# Patient Record
Sex: FEMALE | URBAN - METROPOLITAN AREA
[De-identification: names, ages, dates, MRNs, and addresses within clinical notes are randomized per-mention and may not be internally consistent; named-entity substitution may affect disease eponyms.]

---

## 2019-12-05 ENCOUNTER — HOSPITAL ENCOUNTER (OUTPATIENT)
Age: 59
Discharge: HOME | End: 2019-12-07

## 2019-12-05 PROCEDURE — 88305 TISSUE EXAM BY PATHOLOGIST: CPT

## 2022-02-14 ENCOUNTER — TELEPHONE (OUTPATIENT)
Dept: ORTHOPEDIC SURGERY | Age: 62
End: 2022-02-14

## 2022-02-14 NOTE — TELEPHONE ENCOUNTER
Pt called to a f/u for a fx of her rt radius. She went to [facility] with Dr Hortencia Mast on 02-12-22,  Pt stated she is related to Dr Masha Morrell. She was given a referral by Dr Hortencia Mast, she will fax to the office.   Please review and call pt with recommendations/appt info [phone]

## 2022-02-14 NOTE — TELEPHONE ENCOUNTER
Call placed to patient.     Future Appointments   Date Time Provider Fareed Myers   2/18/2022  8:30 AM SCHEDULE, SE ORTHO RES  [facility]

## 2022-02-16 DIAGNOSIS — S52.501A CLOSED FRACTURE OF DISTAL END OF RIGHT RADIUS, UNSPECIFIED FRACTURE MORPHOLOGY, INITIAL ENCOUNTER: Primary | ICD-10-CM

## 2022-02-18 ENCOUNTER — OFFICE VISIT (OUTPATIENT)
Dept: ORTHOPEDIC SURGERY | Age: 62
End: 2022-02-18
Payer: COMMERCIAL

## 2022-02-18 ENCOUNTER — HOSPITAL ENCOUNTER (OUTPATIENT)
Dept: GENERAL RADIOLOGY | Age: 62
Discharge: HOME | End: 2022-02-20
Payer: COMMERCIAL

## 2022-02-18 VITALS — TEMPERATURE: 98 F | HEIGHT: 71 IN | WEIGHT: 176 LBS | BODY MASS INDEX: 24.64 KG/M2

## 2022-02-18 DIAGNOSIS — M25.562 LEFT KNEE PAIN, UNSPECIFIED CHRONICITY: ICD-10-CM

## 2022-02-18 DIAGNOSIS — S52.501A CLOSED FRACTURE OF DISTAL END OF RIGHT RADIUS, UNSPECIFIED FRACTURE MORPHOLOGY, INITIAL ENCOUNTER: ICD-10-CM

## 2022-02-18 DIAGNOSIS — S52.501A CLOSED FRACTURE OF DISTAL END OF RIGHT RADIUS, UNSPECIFIED FRACTURE MORPHOLOGY, INITIAL ENCOUNTER: Primary | ICD-10-CM

## 2022-02-18 PROCEDURE — 73110 X-RAY EXAM OF WRIST: CPT

## 2022-02-18 PROCEDURE — G8427 DOCREV CUR MEDS BY ELIG CLIN: HCPCS | Performed by: ORTHOPAEDIC SURGERY

## 2022-02-18 PROCEDURE — 99202 OFFICE O/P NEW SF 15 MIN: CPT | Performed by: ORTHOPAEDIC SURGERY

## 2022-02-18 PROCEDURE — 3017F COLORECTAL CA SCREEN DOC REV: CPT | Performed by: ORTHOPAEDIC SURGERY

## 2022-02-18 PROCEDURE — 99204 OFFICE O/P NEW MOD 45 MIN: CPT | Performed by: ORTHOPAEDIC SURGERY

## 2022-02-18 PROCEDURE — G8484 FLU IMMUNIZE NO ADMIN: HCPCS | Performed by: ORTHOPAEDIC SURGERY

## 2022-02-18 PROCEDURE — G8420 CALC BMI NORM PARAMETERS: HCPCS | Performed by: ORTHOPAEDIC SURGERY

## 2022-02-18 PROCEDURE — L3809 WHFO W/O JOINTS PRE OTS: HCPCS | Performed by: ORTHOPAEDIC SURGERY

## 2022-02-18 PROCEDURE — 1036F TOBACCO NON-USER: CPT | Performed by: ORTHOPAEDIC SURGERY

## 2022-02-18 RX ORDER — IBUPROFEN 200 MG
200 TABLET ORAL EVERY 6 HOURS PRN
COMMUNITY

## 2022-02-18 NOTE — PROGRESS NOTES
New Patient Orthopaedic Progress Note    Maggie Blair is a 59 y.o. female, her YOB: 1960 with the following history as recorded in [facility]:      Patient Active Problem List    Diagnosis Date Noted    Closed fracture of right distal radius 2022     Current Outpatient Medications   Medication Sig Dispense Refill    ibuprofen (ADVIL) 200 MG tablet Take 200 mg by mouth every 6 hours as needed for Pain       No current facility-administered medications for this visit. Allergies: Patient has no known allergies. No past medical history on file. No past surgical history on file. No family history on file. Social History     Tobacco Use    Smoking status: Former Smoker     Start date:      Quit date:      Years since quittin.1    Smokeless tobacco: Never Used   Substance Use Topics    Alcohol use: Never                             Chief Complaint   Patient presents with    ED Follow-up     right distal radius fx 22. sugar tong splint intact. taking advil for pain. SUBJECTIVE: Wrist Pain  Patient complains of right wrist pain. She reported that she fell 6 days ago prior to clinical presentation in which she fell back onto her hand. She indicated signification pain and bruising. Pain as gradually subsided over the course of the week. XRs taken at an outside facility indicates minimally displaced distal radius. The pain is mild, worsens with movement, and some relief by rest and over the counter medication. There is not associated numbness, weakness in the right hand, fingers and thumb. Pain has been present for 6 days. There is not a history of injury, strain, overuse. Review of Systems   Constitutional: Negative for fever, chills, diaphoresis, appetite change and fatigue. HENT: Negative for dental issues, hearing loss and tinnitus. Negative for congestion, sinus pressure, sneezing, sore throat. Negative for headache.   Eyes: Negative for visual disturbance, blurred and double vision. Negative for pain, discharge, redness and itching  Respiratory: Negative for cough, shortness of breath and wheezing. Cardiovascular: Negative for chest pain, palpitations and leg swelling. No dyspnea on exertion   Gastrointestinal:   Negative for nausea, vomiting, abdominal pain, diarrhea, constipation  or black or bloody. Hematologic\Lymphatic:  negative for bleeding, petechiae,   Genitourinary: Negative for hematuria and difficulty urinating. Musculoskeletal: Negative for neck pain and stiffness. Negative for back pain, see HPI  Skin: Negative for pallor, rash and wound. Neurological: Negative for dizziness, tremors, seizures, weakness, light-headedness, no TIA or stroke symptoms. No numbness and headaches. Psychiatric/Behavioral: Negative. OBJECTIVE:      Physical Examination:   General appearance: alert, well appearing, and in no distress,  normal appearing weight. No visible signs of trauma   Mental status: alert, oriented to person, place, and time, normal mood, behavior, speech, dress, motor activity, and thought processes  Abdomen: soft, nondistended  Resp:   resp easy and unlabored, no audible wheezes note, normal symmetrical expansion of both hemithoraces  Cardiac: distal pulses palpable, skin and extremities well perfused  Neurological: alert, oriented X3, normal speech, no focal findings or movement disorder noted, motor and sensory grossly normal bilaterally, normal muscle tone, no tremors, strength 5/5, normal gait and station  HEENT: normochephalic atraumatic, external ears and eyes normal, sclera normal, neck supple, no nasal discharge. Extremities:   peripheral pulses normal, no edema, redness or tenderness in the calves   Skin: normal coloration, no rashes or open wounds, no suspicious skin lesions noted  Psych: Affect euthymic   Musculoskeletal:   Extremity:  Skin intact circumferentially. Mild bruising located on the ulnar styloid.   Ulnar styloid prominence present compared to contralateral side  Mild TTP to dorsal aspect of distal radius. NO TTP to radial shaft or elbow  Full ROM at the wrist, elbow and shoulder  Sensation grossly intact to radial, ulnar, and median nerves  Motor distribution intact median, ulnar, radial, AIN, PIN  Cap refill <2   Compartment soft and compressible. Temp 98 °F (36.7 °C) (Oral)   Ht 5' 11\" (1.803 m)   Wt 176 lb (79.8 kg)   BMI 24.55 kg/m²      XR: 2/18/22 Right wrist demonstrating a minimal displaced distal radius fracture. Very minimal cortical disruption intraarticular. No other fractures or dislocation notes on plain imaging. ASSESSMENT:     Diagnosis Orders   1. Closed fracture of distal end of right radius, unspecified fracture morphology, initial encounter         Discussion: Had lengthy discussion with patient regarding Her diagnosis, typical prognosis, and expected outcomes. I reviewed the possible complications from the injury itself despite treatment choosen. I also discussed treatment options including nonoperative managements versus surgical management, along with risks and benefits of each. They have elected for nonoperative management at this time. PLAN:  Weight bearing as tolerated to the right wrist.  Splint removed in clinic and removable Velcro brace provided to the patient  Educated to avoid heavy lifting, pushing and pulling. Brace can be removed for gentle every day activities and cleansing. Repeat XR of affect wrist and Left knee in 4 weeks. Discussed with attending    Electronically signed by Sarkis Monae DO on 2/18/2022     Orthopaedic Attending    I have seen and evaluated the patient with the resident and agree with the above assessments on today's visit. I have performed the key components of the history and physical examination and concur completely with the findings and plans as documented above. Patient seen today for right wrist pain.   Imaging demonstrates occult fracture through the radial styloid which is consistent with her clinical examination. Treatment options discussed and outlined including operative and nonoperative managements for distal radius fractures. Patient is in agreement for close treatment for her fracture. Patient immobilized with a Velcro wrist brace. Discussed rec nonweightbearing, she is to continue with the brace full-time. To see his back in the office in 4 weeks for repeat evaluation. Patient states she also like to be evaluated for her left knee at next visit she has had some intermittent swelling which has been chronic in nature. She is able to fully weight-bear on the left knee at this time. Electronically signed by   David Blankenship DO  2/18/22      Note: This report was completed using [employer] voiced recognition software. Every effort has been made to ensure accuracy; however, inadvertent computerized transcription errors may be present.

## 2022-02-18 NOTE — PATIENT INSTRUCTIONS
Weightbearing: WBAT    Activity restrictions: Pt is to increase activities as tolerated. Swelling Control: Elevate arm/hand above the level of the heart Ice application    Pain Control:NSAID: Ibuprofen    Therapy: Gentle range of motion when brace is removed. Continue: Continue current pain medication regimen. Gentle range of motion when out of the brace. Brace can be removed for gentle every day activities and cleansing purposes    Follow up: In 4 weeks    Please call the office at [phone] or send [employer] message to providers sooner with any questions or concerns  Strongly recommend all of our patients sign up for [employer] in order to have direct communication VIA [employer] JACKY with our clinic staff.

## 2022-03-17 ENCOUNTER — HOSPITAL ENCOUNTER (OUTPATIENT)
Dept: GENERAL RADIOLOGY | Age: 62
Discharge: HOME | End: 2022-03-19
Payer: COMMERCIAL

## 2022-03-17 ENCOUNTER — OFFICE VISIT (OUTPATIENT)
Dept: ORTHOPEDIC SURGERY | Age: 62
End: 2022-03-17
Payer: COMMERCIAL

## 2022-03-17 DIAGNOSIS — M25.462 EFFUSION OF LEFT KNEE: ICD-10-CM

## 2022-03-17 DIAGNOSIS — S52.501A CLOSED FRACTURE OF DISTAL END OF RIGHT RADIUS, UNSPECIFIED FRACTURE MORPHOLOGY, INITIAL ENCOUNTER: ICD-10-CM

## 2022-03-17 DIAGNOSIS — S52.501A CLOSED FRACTURE OF DISTAL END OF RIGHT RADIUS, UNSPECIFIED FRACTURE MORPHOLOGY, INITIAL ENCOUNTER: Primary | ICD-10-CM

## 2022-03-17 DIAGNOSIS — M25.562 LEFT KNEE PAIN, UNSPECIFIED CHRONICITY: ICD-10-CM

## 2022-03-17 DIAGNOSIS — M17.12 OSTEOARTHRITIS OF LEFT KNEE, UNSPECIFIED OSTEOARTHRITIS TYPE: ICD-10-CM

## 2022-03-17 PROCEDURE — 1036F TOBACCO NON-USER: CPT | Performed by: PHYSICIAN ASSISTANT

## 2022-03-17 PROCEDURE — 99212 OFFICE O/P EST SF 10 MIN: CPT | Performed by: PHYSICIAN ASSISTANT

## 2022-03-17 PROCEDURE — G8420 CALC BMI NORM PARAMETERS: HCPCS | Performed by: PHYSICIAN ASSISTANT

## 2022-03-17 PROCEDURE — G8484 FLU IMMUNIZE NO ADMIN: HCPCS | Performed by: PHYSICIAN ASSISTANT

## 2022-03-17 PROCEDURE — 73110 X-RAY EXAM OF WRIST: CPT

## 2022-03-17 PROCEDURE — 73562 X-RAY EXAM OF KNEE 3: CPT

## 2022-03-17 PROCEDURE — 99213 OFFICE O/P EST LOW 20 MIN: CPT | Performed by: PHYSICIAN ASSISTANT

## 2022-03-17 PROCEDURE — G8427 DOCREV CUR MEDS BY ELIG CLIN: HCPCS | Performed by: PHYSICIAN ASSISTANT

## 2022-03-17 PROCEDURE — 3017F COLORECTAL CA SCREEN DOC REV: CPT | Performed by: PHYSICIAN ASSISTANT

## 2022-03-17 RX ORDER — M-VIT,TX,IRON,MINS/CALC/FOLIC 27MG-0.4MG
1 TABLET ORAL DAILY
COMMUNITY

## 2022-03-17 NOTE — PROGRESS NOTES
Chief Complaint   Patient presents with    Wrist Injury     02/12/22 right radius fx. wearing brace. pain controlled, NWB.    Knee Pain     left knee pain and swelling. started late december, PCP ordered steroid which alleviated swelling, but swelling returned when steroid completed. Subjective:  Maggie Blair is approximately 4.5 wks follow-up from the above DOI from approximately 2/13/22. Presents today in wrist brace. Denies any wrist pain or paresthesias. States that her wrist fracture is doing very well. States that for the past couple of years she has had intermittent left knee effusions attributable to left knee OA without injury or erythema or warmth or fever or chills. States that sometimes her knee became so edematous that is hard to bend to put clothing on. She is still full weightbearing left lower extremity does not use any assistive devices. States that left knee is really painful and does not have to take anything for pain, and has tried to elevate but has not tried consistent compression or ice. Review of Systems -  all pertinent positives and negatives in HPI. Objective:    General: Alert and oriented X 3, normocephalic atraumatic, external ears and eye normal, sclera clear, no acute distress, respirations easy and unlabored with no audible wheezes, skin warm and dry, speech and dress appropriate for noted age, affect euthymic. Extremity:  Left Lower Extremity  Skin clean dry and intact, without signs of infection  AROM L knee 0-120  Small L knee effusion with all landmarks still easily palpable  L knee nontender globally   Compartments supple throughout thigh and leg  Calf supple and nontender  Demonstrates active ankle plantar/dorsiflexion/great toe extension. States sensation intact to touch in sural/deep peroneal/superficial peroneal/saphenous/posterior tibial nerve distributions to foot/ankle.    Palpable dorsalis pedis and posterior tibialis pulses, cap refill brisk in toes, foot warm/perfused. Right Upper Extremity  Skin is clean dry and intact  No wrist edema noted  Radial pulse palpable, fingers warm with BCR  Flex/extension intact to wrist, thumb and fingers  Full active wrist flexion, extension, ulnar and radial deviation without any discomfort  Nontender globally around the wrist  Actively make full composite fist  Finger opposition intact  Finger adduction/abduction intact  Finger crossover intact  Subjectively states sensation intact to radial/medial/ulnar distribution            There were no vitals taken for this visit. XR:   Narrative   EXAMINATION:   THREE XRAY VIEWS OF THE LEFT KNEE       3/17/2022 8:21 am       COMPARISON:   None.       HISTORY:   ORDERING SYSTEM PROVIDED HISTORY: Left knee pain, unspecified chronicity   TECHNOLOGIST PROVIDED HISTORY:   What reading provider will be dictating this exam?->[facility]       FINDINGS:   Three views of the left knee were obtained. Richard Letters is no acute fracture or   dislocation of the left knee.  There are moderate degenerative changes of the   left knee.  There is no osseous lesion.  There is a small joint effusion.           Impression   1. Moderate degenerative changes of the left knee. 2. Small joint effusion.           Narrative   EXAMINATION:   XRAY VIEWS OF THE RIGHT WRIST       3/17/2022 8:21 am       COMPARISON:   None.       HISTORY:   ORDERING SYSTEM PROVIDED HISTORY:  Closed fracture of distal end of right   radius, unspecified fracture morphology, initial encounter. TECHNOLOGIST PROVIDED HISTORY:   What reading provider will be dictating this exam? [facility]       FINDINGS:   Three views of the right wrist were obtained. Richard Letters is a healing horizontal   fracture through the distal radius.  A vague fracture lucency persists. Anatomical alignment is maintained.       The carpal bones are normally aligned.                 Assessment:   Diagnosis Orders   1.  Closed fracture of distal end of right radius, unspecified fracture morphology, initial encounter     2. Effusion of left knee     3. Osteoarthritis of left knee, unspecified osteoarthritis type         Plan:   Reviewed x-rays with patient today in office    Can gradually increase weightbearing activity the right upper extremity with wrist brace on if tolerable. Once full weightbearing with the brace on without pain, can wean from brace altogether. Can remove brace when sedentary.  Discussed typical treatment algorithm of knee osteoarthritis. Patient does have a small effusion today, but not nearly enough to warrant aspiration. No signs of infection. Recommend ice, elevation, compression, over-the-counter knee brace if needed for compression. Call the office if conditions change or worsen to consider knee aspiration. Follow up as needed    Electronically signed by Kirill Soares PA-C on 3/17/2022 at 9:26 AM  Note: This report was completed using [employer] voiced recognition software. Every effort has been made to ensure accuracy; however, inadvertent computerized transcription errors may be present.

## 2024-05-04 ENCOUNTER — APPOINTMENT (OUTPATIENT)
Dept: GENERAL RADIOLOGY | Age: 64
DRG: 482 | End: 2024-05-04
Payer: MEDICARE

## 2024-05-04 ENCOUNTER — HOSPITAL ENCOUNTER (INPATIENT)
Age: 64
LOS: 1 days | Discharge: HOME HEALTH | DRG: 482 | End: 2024-05-06
Attending: EMERGENCY MEDICINE | Admitting: HOSPITALIST
Payer: MEDICARE

## 2024-05-04 DIAGNOSIS — S72.144A CLOSED NONDISPLACED INTERTROCHANTERIC FRACTURE OF RIGHT FEMUR, INITIAL ENCOUNTER (HCC): ICD-10-CM
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
## 2024-05-04 DIAGNOSIS — S72.141A INTERTROCHANTERIC FRACTURE OF RIGHT FEMUR, CLOSED, INITIAL ENCOUNTER (HCC): ICD-10-CM

## 2024-05-04 DIAGNOSIS — S62.001A CLOSED NONDISPLACED FRACTURE OF SCAPHOID OF RIGHT WRIST, UNSPECIFIED PORTION OF SCAPHOID, INITIAL ENCOUNTER: ICD-10-CM

## 2024-05-04 PROCEDURE — 73502 X-RAY EXAM HIP UNI 2-3 VIEWS: CPT

## 2024-05-04 PROCEDURE — 96372 THER/PROPH/DIAG INJ SC/IM: CPT

## 2024-05-04 PROCEDURE — 99285 EMERGENCY DEPT VISIT HI MDM: CPT

## 2024-05-04 PROCEDURE — 73552 X-RAY EXAM OF FEMUR 2/>: CPT

## 2024-05-04 PROCEDURE — 6370000000 HC RX 637 (ALT 250 FOR IP): Performed by: EMERGENCY MEDICINE

## 2024-05-04 PROCEDURE — 6360000002 HC RX W HCPCS: Performed by: EMERGENCY MEDICINE

## 2024-05-04 PROCEDURE — 29125 APPL SHORT ARM SPLINT STATIC: CPT

## 2024-05-04 PROCEDURE — 73610 X-RAY EXAM OF ANKLE: CPT

## 2024-05-04 PROCEDURE — 71045 X-RAY EXAM CHEST 1 VIEW: CPT

## 2024-05-04 PROCEDURE — 73110 X-RAY EXAM OF WRIST: CPT

## 2024-05-04 RX ORDER — HYDROCODONE BITARTRATE AND ACETAMINOPHEN 5; 325 MG/1; MG/1
2 TABLET ORAL ONCE
Status: COMPLETED | OUTPATIENT
Start: 2024-05-04 | End: 2024-05-04

## 2024-05-04 RX ORDER — ORPHENADRINE CITRATE 30 MG/ML
60 INJECTION INTRAMUSCULAR; INTRAVENOUS ONCE
Status: COMPLETED | OUTPATIENT
Start: 2024-05-04 | End: 2024-05-04

## 2024-05-04 RX ADMIN — ORPHENADRINE CITRATE 60 MG: 30 INJECTION, SOLUTION INTRAMUSCULAR; INTRAVENOUS at 23:32

## 2024-05-04 RX ADMIN — HYDROCODONE BITARTRATE AND ACETAMINOPHEN 2 TABLET: 5; 325 TABLET ORAL at 23:31

## 2024-05-04 ASSESSMENT — PAIN DESCRIPTION - LOCATION
LOCATION: HIP

## 2024-05-04 ASSESSMENT — PAIN SCALES - GENERAL
PAINLEVEL_OUTOF10: 8
PAINLEVEL_OUTOF10: 6
PAINLEVEL_OUTOF10: 6

## 2024-05-04 ASSESSMENT — LIFESTYLE VARIABLES
HOW OFTEN DO YOU HAVE A DRINK CONTAINING ALCOHOL: MONTHLY OR LESS
HOW MANY STANDARD DRINKS CONTAINING ALCOHOL DO YOU HAVE ON A TYPICAL DAY: 1 OR 2

## 2024-05-04 ASSESSMENT — PAIN DESCRIPTION - ORIENTATION
ORIENTATION: RIGHT

## 2024-05-04 ASSESSMENT — PAIN DESCRIPTION - PAIN TYPE: TYPE: ACUTE PAIN

## 2024-05-04 ASSESSMENT — PAIN DESCRIPTION - FREQUENCY: FREQUENCY: INTERMITTENT

## 2024-05-04 ASSESSMENT — PAIN DESCRIPTION - DESCRIPTORS: DESCRIPTORS: DISCOMFORT

## 2024-05-05 ENCOUNTER — ANESTHESIA (OUTPATIENT)
Dept: OPERATING ROOM | Age: 64
End: 2024-05-05
Payer: MEDICARE

## 2024-05-05 ENCOUNTER — APPOINTMENT (OUTPATIENT)
Dept: GENERAL RADIOLOGY | Age: 64
DRG: 482 | End: 2024-05-05
Payer: MEDICARE

## 2024-05-05 ENCOUNTER — ANESTHESIA EVENT (OUTPATIENT)
Dept: OPERATING ROOM | Age: 64
End: 2024-05-05
Payer: MEDICARE

## 2024-05-05 PROBLEM — S72.141A INTERTROCHANTERIC FRACTURE OF RIGHT FEMUR, CLOSED, INITIAL ENCOUNTER (HCC): Status: ACTIVE | Noted: 2024-05-05

## 2024-05-05 LAB
BASOPHILS # BLD: 0.09 K/UL (ref 0–0.2)
BASOPHILS NFR BLD: 1 % (ref 0–2)
EOSINOPHIL # BLD: 0.14 K/UL (ref 0.05–0.5)
EOSINOPHILS RELATIVE PERCENT: 1 % (ref 0–6)
ERYTHROCYTE [DISTWIDTH] IN BLOOD BY AUTOMATED COUNT: 13.5 % (ref 11.5–15)
HCT VFR BLD AUTO: 40.2 % (ref 34–48)
HGB BLD-MCNC: 13.2 G/DL (ref 11.5–15.5)
IMM GRANULOCYTES # BLD AUTO: 0.08 K/UL (ref 0–0.58)
IMM GRANULOCYTES NFR BLD: 1 % (ref 0–5)
INR PPP: 1
LYMPHOCYTES NFR BLD: 1.96 K/UL (ref 1.5–4)
LYMPHOCYTES RELATIVE PERCENT: 13 % (ref 20–42)
MCH RBC QN AUTO: 31.2 PG (ref 26–35)
MCHC RBC AUTO-ENTMCNC: 32.8 G/DL (ref 32–34.5)
MCV RBC AUTO: 95 FL (ref 80–99.9)
MONOCYTES NFR BLD: 0.86 K/UL (ref 0.1–0.95)
MONOCYTES NFR BLD: 6 % (ref 2–12)
NEUTROPHILS NFR BLD: 79 % (ref 43–80)
NEUTS SEG NFR BLD: 11.84 K/UL (ref 1.8–7.3)
PLATELET # BLD AUTO: 320 K/UL (ref 130–450)
PMV BLD AUTO: 10.8 FL (ref 7–12)
PROTHROMBIN TIME: 10.9 SEC (ref 9.3–12.4)
RBC # BLD AUTO: 4.23 M/UL (ref 3.5–5.5)
WBC OTHER # BLD: 15 K/UL (ref 4.5–11.5)

## 2024-05-05 PROCEDURE — 6370000000 HC RX 637 (ALT 250 FOR IP): Performed by: NURSE PRACTITIONER

## 2024-05-05 PROCEDURE — 6370000000 HC RX 637 (ALT 250 FOR IP): Performed by: STUDENT IN AN ORGANIZED HEALTH CARE EDUCATION/TRAINING PROGRAM

## 2024-05-05 PROCEDURE — 7100000000 HC PACU RECOVERY - FIRST 15 MIN: Performed by: STUDENT IN AN ORGANIZED HEALTH CARE EDUCATION/TRAINING PROGRAM

## 2024-05-05 PROCEDURE — 2500000003 HC RX 250 WO HCPCS: Performed by: NURSE ANESTHETIST, CERTIFIED REGISTERED

## 2024-05-05 PROCEDURE — 85025 COMPLETE CBC W/AUTO DIFF WBC: CPT

## 2024-05-05 PROCEDURE — 2500000003 HC RX 250 WO HCPCS: Performed by: STUDENT IN AN ORGANIZED HEALTH CARE EDUCATION/TRAINING PROGRAM

## 2024-05-05 PROCEDURE — 3600000014 HC SURGERY LEVEL 4 ADDTL 15MIN: Performed by: STUDENT IN AN ORGANIZED HEALTH CARE EDUCATION/TRAINING PROGRAM

## 2024-05-05 PROCEDURE — 3600000004 HC SURGERY LEVEL 4 BASE: Performed by: STUDENT IN AN ORGANIZED HEALTH CARE EDUCATION/TRAINING PROGRAM

## 2024-05-05 PROCEDURE — 2580000003 HC RX 258: Performed by: STUDENT IN AN ORGANIZED HEALTH CARE EDUCATION/TRAINING PROGRAM

## 2024-05-05 PROCEDURE — 85610 PROTHROMBIN TIME: CPT

## 2024-05-05 PROCEDURE — 6360000002 HC RX W HCPCS: Performed by: NURSE ANESTHETIST, CERTIFIED REGISTERED

## 2024-05-05 PROCEDURE — 1200000000 HC SEMI PRIVATE

## 2024-05-05 PROCEDURE — 6360000002 HC RX W HCPCS: Performed by: STUDENT IN AN ORGANIZED HEALTH CARE EDUCATION/TRAINING PROGRAM

## 2024-05-05 PROCEDURE — C1769 GUIDE WIRE: HCPCS | Performed by: STUDENT IN AN ORGANIZED HEALTH CARE EDUCATION/TRAINING PROGRAM

## 2024-05-05 PROCEDURE — 7100000001 HC PACU RECOVERY - ADDTL 15 MIN: Performed by: STUDENT IN AN ORGANIZED HEALTH CARE EDUCATION/TRAINING PROGRAM

## 2024-05-05 PROCEDURE — 3700000000 HC ANESTHESIA ATTENDED CARE: Performed by: STUDENT IN AN ORGANIZED HEALTH CARE EDUCATION/TRAINING PROGRAM

## 2024-05-05 PROCEDURE — 6360000002 HC RX W HCPCS: Performed by: ANESTHESIOLOGY

## 2024-05-05 PROCEDURE — 82565 ASSAY OF CREATININE: CPT

## 2024-05-05 PROCEDURE — 2720000010 HC SURG SUPPLY STERILE: Performed by: STUDENT IN AN ORGANIZED HEALTH CARE EDUCATION/TRAINING PROGRAM

## 2024-05-05 PROCEDURE — 3700000001 HC ADD 15 MINUTES (ANESTHESIA): Performed by: STUDENT IN AN ORGANIZED HEALTH CARE EDUCATION/TRAINING PROGRAM

## 2024-05-05 PROCEDURE — 0QS606Z REPOSITION RIGHT UPPER FEMUR WITH INTRAMEDULLARY INTERNAL FIXATION DEVICE, OPEN APPROACH: ICD-10-PCS | Performed by: STUDENT IN AN ORGANIZED HEALTH CARE EDUCATION/TRAINING PROGRAM

## 2024-05-05 PROCEDURE — 87081 CULTURE SCREEN ONLY: CPT

## 2024-05-05 PROCEDURE — 2709999900 HC NON-CHARGEABLE SUPPLY: Performed by: STUDENT IN AN ORGANIZED HEALTH CARE EDUCATION/TRAINING PROGRAM

## 2024-05-05 PROCEDURE — 2580000003 HC RX 258: Performed by: NURSE ANESTHETIST, CERTIFIED REGISTERED

## 2024-05-05 PROCEDURE — C1713 ANCHOR/SCREW BN/BN,TIS/BN: HCPCS | Performed by: STUDENT IN AN ORGANIZED HEALTH CARE EDUCATION/TRAINING PROGRAM

## 2024-05-05 DEVICE — SCREW BNE L36MM DIA5MM TIB LT GRN TI ST CANN LOK FULL THRD: Type: IMPLANTABLE DEVICE | Site: HIP | Status: FUNCTIONAL

## 2024-05-05 DEVICE — IMPLANTABLE DEVICE: Type: IMPLANTABLE DEVICE | Site: HIP | Status: FUNCTIONAL

## 2024-05-05 DEVICE — SCREW BNE L105MM DIA10.35MM G TI CANN PERF FOR PROX FEM: Type: IMPLANTABLE DEVICE | Site: HIP | Status: FUNCTIONAL

## 2024-05-05 RX ORDER — ACETAMINOPHEN 325 MG/1
650 TABLET ORAL EVERY 6 HOURS PRN
Status: DISCONTINUED | OUTPATIENT
Start: 2024-05-05 | End: 2024-05-06 | Stop reason: HOSPADM

## 2024-05-05 RX ORDER — ASPIRIN 81 MG/1
81 TABLET ORAL 2 TIMES DAILY
Status: DISCONTINUED | OUTPATIENT
Start: 2024-05-05 | End: 2024-05-06 | Stop reason: HOSPADM

## 2024-05-05 RX ORDER — SODIUM CHLORIDE 0.9 % (FLUSH) 0.9 %
5-40 SYRINGE (ML) INJECTION PRN
Status: DISCONTINUED | OUTPATIENT
Start: 2024-05-05 | End: 2024-05-06 | Stop reason: HOSPADM

## 2024-05-05 RX ORDER — HYDROCODONE BITARTRATE AND ACETAMINOPHEN 5; 325 MG/1; MG/1
2 TABLET ORAL EVERY 6 HOURS PRN
Status: DISCONTINUED | OUTPATIENT
Start: 2024-05-05 | End: 2024-05-06 | Stop reason: HOSPADM

## 2024-05-05 RX ORDER — MIDAZOLAM HYDROCHLORIDE 2 MG/2ML
2 INJECTION, SOLUTION INTRAMUSCULAR; INTRAVENOUS
Status: DISCONTINUED | OUTPATIENT
Start: 2024-05-05 | End: 2024-05-05 | Stop reason: HOSPADM

## 2024-05-05 RX ORDER — SODIUM CHLORIDE 0.9 % (FLUSH) 0.9 %
5-40 SYRINGE (ML) INJECTION PRN
Status: DISCONTINUED | OUTPATIENT
Start: 2024-05-05 | End: 2024-05-05 | Stop reason: HOSPADM

## 2024-05-05 RX ORDER — DEXAMETHASONE SODIUM PHOSPHATE 4 MG/ML
INJECTION, SOLUTION INTRA-ARTICULAR; INTRALESIONAL; INTRAMUSCULAR; INTRAVENOUS; SOFT TISSUE PRN
Status: DISCONTINUED | OUTPATIENT
Start: 2024-05-05 | End: 2024-05-05 | Stop reason: SDUPTHER

## 2024-05-05 RX ORDER — ROCURONIUM BROMIDE 10 MG/ML
INJECTION, SOLUTION INTRAVENOUS PRN
Status: DISCONTINUED | OUTPATIENT
Start: 2024-05-05 | End: 2024-05-05 | Stop reason: SDUPTHER

## 2024-05-05 RX ORDER — ONDANSETRON 2 MG/ML
4 INJECTION INTRAMUSCULAR; INTRAVENOUS EVERY 6 HOURS PRN
Status: DISCONTINUED | OUTPATIENT
Start: 2024-05-05 | End: 2024-05-06 | Stop reason: HOSPADM

## 2024-05-05 RX ORDER — POTASSIUM CHLORIDE 7.45 MG/ML
10 INJECTION INTRAVENOUS PRN
Status: DISCONTINUED | OUTPATIENT
Start: 2024-05-05 | End: 2024-05-06 | Stop reason: HOSPADM

## 2024-05-05 RX ORDER — MEPERIDINE HYDROCHLORIDE 25 MG/ML
12.5 INJECTION INTRAMUSCULAR; INTRAVENOUS; SUBCUTANEOUS EVERY 5 MIN PRN
Status: DISCONTINUED | OUTPATIENT
Start: 2024-05-05 | End: 2024-05-05 | Stop reason: HOSPADM

## 2024-05-05 RX ORDER — IPRATROPIUM BROMIDE AND ALBUTEROL SULFATE 2.5; .5 MG/3ML; MG/3ML
1 SOLUTION RESPIRATORY (INHALATION)
Status: DISCONTINUED | OUTPATIENT
Start: 2024-05-05 | End: 2024-05-05 | Stop reason: HOSPADM

## 2024-05-05 RX ORDER — MORPHINE SULFATE 2 MG/ML
2 INJECTION, SOLUTION INTRAMUSCULAR; INTRAVENOUS EVERY 4 HOURS PRN
Status: DISCONTINUED | OUTPATIENT
Start: 2024-05-05 | End: 2024-05-06 | Stop reason: HOSPADM

## 2024-05-05 RX ORDER — PROPOFOL 10 MG/ML
INJECTION, EMULSION INTRAVENOUS PRN
Status: DISCONTINUED | OUTPATIENT
Start: 2024-05-05 | End: 2024-05-05 | Stop reason: SDUPTHER

## 2024-05-05 RX ORDER — SODIUM CHLORIDE 0.9 % (FLUSH) 0.9 %
5-40 SYRINGE (ML) INJECTION EVERY 12 HOURS SCHEDULED
Status: DISCONTINUED | OUTPATIENT
Start: 2024-05-05 | End: 2024-05-06 | Stop reason: HOSPADM

## 2024-05-05 RX ORDER — POTASSIUM CHLORIDE 20 MEQ/1
40 TABLET, EXTENDED RELEASE ORAL PRN
Status: DISCONTINUED | OUTPATIENT
Start: 2024-05-05 | End: 2024-05-06 | Stop reason: HOSPADM

## 2024-05-05 RX ORDER — SODIUM CHLORIDE 0.9 % (FLUSH) 0.9 %
5-40 SYRINGE (ML) INJECTION EVERY 12 HOURS SCHEDULED
Status: DISCONTINUED | OUTPATIENT
Start: 2024-05-05 | End: 2024-05-05 | Stop reason: HOSPADM

## 2024-05-05 RX ORDER — LIDOCAINE HYDROCHLORIDE 20 MG/ML
INJECTION, SOLUTION EPIDURAL; INFILTRATION; INTRACAUDAL; PERINEURAL PRN
Status: DISCONTINUED | OUTPATIENT
Start: 2024-05-05 | End: 2024-05-05 | Stop reason: SDUPTHER

## 2024-05-05 RX ORDER — PHENYLEPHRINE HCL IN 0.9% NACL 1 MG/10 ML
SYRINGE (ML) INTRAVENOUS PRN
Status: DISCONTINUED | OUTPATIENT
Start: 2024-05-05 | End: 2024-05-05 | Stop reason: SDUPTHER

## 2024-05-05 RX ORDER — FENTANYL CITRATE 50 UG/ML
INJECTION, SOLUTION INTRAMUSCULAR; INTRAVENOUS PRN
Status: DISCONTINUED | OUTPATIENT
Start: 2024-05-05 | End: 2024-05-05 | Stop reason: SDUPTHER

## 2024-05-05 RX ORDER — ACETAMINOPHEN 650 MG/1
650 SUPPOSITORY RECTAL EVERY 6 HOURS PRN
Status: DISCONTINUED | OUTPATIENT
Start: 2024-05-05 | End: 2024-05-06 | Stop reason: HOSPADM

## 2024-05-05 RX ORDER — SODIUM CHLORIDE 9 MG/ML
INJECTION, SOLUTION INTRAVENOUS PRN
Status: DISCONTINUED | OUTPATIENT
Start: 2024-05-05 | End: 2024-05-06 | Stop reason: HOSPADM

## 2024-05-05 RX ORDER — DIPHENHYDRAMINE HYDROCHLORIDE 50 MG/ML
INJECTION INTRAMUSCULAR; INTRAVENOUS PRN
Status: DISCONTINUED | OUTPATIENT
Start: 2024-05-05 | End: 2024-05-05 | Stop reason: SDUPTHER

## 2024-05-05 RX ORDER — SODIUM CHLORIDE, SODIUM LACTATE, POTASSIUM CHLORIDE, CALCIUM CHLORIDE 600; 310; 30; 20 MG/100ML; MG/100ML; MG/100ML; MG/100ML
INJECTION, SOLUTION INTRAVENOUS CONTINUOUS PRN
Status: DISCONTINUED | OUTPATIENT
Start: 2024-05-05 | End: 2024-05-05 | Stop reason: SDUPTHER

## 2024-05-05 RX ORDER — ONDANSETRON 4 MG/1
4 TABLET, ORALLY DISINTEGRATING ORAL EVERY 8 HOURS PRN
Status: DISCONTINUED | OUTPATIENT
Start: 2024-05-05 | End: 2024-05-06 | Stop reason: HOSPADM

## 2024-05-05 RX ORDER — SODIUM CHLORIDE 0.9 % (FLUSH) 0.9 %
10 SYRINGE (ML) INJECTION EVERY 12 HOURS SCHEDULED
Status: DISCONTINUED | OUTPATIENT
Start: 2024-05-05 | End: 2024-05-05 | Stop reason: SDUPTHER

## 2024-05-05 RX ORDER — TRANEXAMIC ACID 10 MG/ML
INJECTION, SOLUTION INTRAVENOUS
Status: COMPLETED
Start: 2024-05-05 | End: 2024-05-05

## 2024-05-05 RX ORDER — HYDRALAZINE HYDROCHLORIDE 20 MG/ML
10 INJECTION INTRAMUSCULAR; INTRAVENOUS
Status: DISCONTINUED | OUTPATIENT
Start: 2024-05-05 | End: 2024-05-05 | Stop reason: HOSPADM

## 2024-05-05 RX ORDER — SENNOSIDES A AND B 8.6 MG/1
1 TABLET, FILM COATED ORAL DAILY PRN
Status: DISCONTINUED | OUTPATIENT
Start: 2024-05-05 | End: 2024-05-06 | Stop reason: HOSPADM

## 2024-05-05 RX ORDER — MAGNESIUM SULFATE IN WATER 40 MG/ML
2000 INJECTION, SOLUTION INTRAVENOUS PRN
Status: DISCONTINUED | OUTPATIENT
Start: 2024-05-05 | End: 2024-05-06 | Stop reason: HOSPADM

## 2024-05-05 RX ORDER — ONDANSETRON 2 MG/ML
4 INJECTION INTRAMUSCULAR; INTRAVENOUS
Status: COMPLETED | OUTPATIENT
Start: 2024-05-05 | End: 2024-05-05

## 2024-05-05 RX ORDER — CEFAZOLIN 2 G/1
INJECTION, POWDER, FOR SOLUTION INTRAMUSCULAR; INTRAVENOUS
Status: COMPLETED
Start: 2024-05-05 | End: 2024-05-05

## 2024-05-05 RX ORDER — KETOROLAC TROMETHAMINE 30 MG/ML
INJECTION, SOLUTION INTRAMUSCULAR; INTRAVENOUS PRN
Status: DISCONTINUED | OUTPATIENT
Start: 2024-05-05 | End: 2024-05-05 | Stop reason: SDUPTHER

## 2024-05-05 RX ORDER — NALOXONE HYDROCHLORIDE 0.4 MG/ML
INJECTION, SOLUTION INTRAMUSCULAR; INTRAVENOUS; SUBCUTANEOUS PRN
Status: DISCONTINUED | OUTPATIENT
Start: 2024-05-05 | End: 2024-05-05 | Stop reason: HOSPADM

## 2024-05-05 RX ORDER — MIDAZOLAM HYDROCHLORIDE 1 MG/ML
INJECTION INTRAMUSCULAR; INTRAVENOUS PRN
Status: DISCONTINUED | OUTPATIENT
Start: 2024-05-05 | End: 2024-05-05 | Stop reason: SDUPTHER

## 2024-05-05 RX ORDER — LABETALOL HYDROCHLORIDE 5 MG/ML
10 INJECTION, SOLUTION INTRAVENOUS
Status: DISCONTINUED | OUTPATIENT
Start: 2024-05-05 | End: 2024-05-05 | Stop reason: HOSPADM

## 2024-05-05 RX ORDER — TRANEXAMIC ACID 10 MG/ML
1000 INJECTION, SOLUTION INTRAVENOUS ONCE
Status: COMPLETED | OUTPATIENT
Start: 2024-05-05 | End: 2024-05-05

## 2024-05-05 RX ORDER — SODIUM CHLORIDE 9 MG/ML
INJECTION, SOLUTION INTRAVENOUS PRN
Status: DISCONTINUED | OUTPATIENT
Start: 2024-05-05 | End: 2024-05-05 | Stop reason: HOSPADM

## 2024-05-05 RX ORDER — MORPHINE SULFATE 4 MG/ML
4 INJECTION, SOLUTION INTRAMUSCULAR; INTRAVENOUS EVERY 4 HOURS PRN
Status: DISCONTINUED | OUTPATIENT
Start: 2024-05-05 | End: 2024-05-06 | Stop reason: HOSPADM

## 2024-05-05 RX ORDER — SODIUM CHLORIDE 0.9 % (FLUSH) 0.9 %
10 SYRINGE (ML) INJECTION PRN
Status: DISCONTINUED | OUTPATIENT
Start: 2024-05-05 | End: 2024-05-05 | Stop reason: SDUPTHER

## 2024-05-05 RX ORDER — ONDANSETRON 2 MG/ML
INJECTION INTRAMUSCULAR; INTRAVENOUS PRN
Status: DISCONTINUED | OUTPATIENT
Start: 2024-05-05 | End: 2024-05-05 | Stop reason: SDUPTHER

## 2024-05-05 RX ORDER — SODIUM CHLORIDE 9 MG/ML
INJECTION, SOLUTION INTRAVENOUS PRN
Status: DISCONTINUED | OUTPATIENT
Start: 2024-05-05 | End: 2024-05-05 | Stop reason: SDUPTHER

## 2024-05-05 RX ADMIN — SODIUM CHLORIDE, PRESERVATIVE FREE 10 ML: 5 INJECTION INTRAVENOUS at 21:11

## 2024-05-05 RX ADMIN — ONDANSETRON 4 MG: 2 INJECTION INTRAMUSCULAR; INTRAVENOUS at 12:51

## 2024-05-05 RX ADMIN — MIDAZOLAM 2 MG: 1 INJECTION INTRAMUSCULAR; INTRAVENOUS at 12:20

## 2024-05-05 RX ADMIN — SUGAMMADEX 200 MG: 100 INJECTION, SOLUTION INTRAVENOUS at 13:15

## 2024-05-05 RX ADMIN — MORPHINE SULFATE 4 MG: 4 INJECTION, SOLUTION INTRAMUSCULAR; INTRAVENOUS at 15:26

## 2024-05-05 RX ADMIN — LIDOCAINE HYDROCHLORIDE 20 MG: 20 INJECTION, SOLUTION EPIDURAL; INFILTRATION; INTRACAUDAL; PERINEURAL at 12:28

## 2024-05-05 RX ADMIN — FENTANYL CITRATE 50 MCG: 50 INJECTION, SOLUTION INTRAMUSCULAR; INTRAVENOUS at 12:28

## 2024-05-05 RX ADMIN — WATER 2000 MG: 1 INJECTION INTRAMUSCULAR; INTRAVENOUS; SUBCUTANEOUS at 21:11

## 2024-05-05 RX ADMIN — ASPIRIN 81 MG: 81 TABLET, COATED ORAL at 21:11

## 2024-05-05 RX ADMIN — ROCURONIUM BROMIDE 20 MG: 10 INJECTION, SOLUTION INTRAVENOUS at 12:47

## 2024-05-05 RX ADMIN — ROCURONIUM BROMIDE 30 MG: 10 INJECTION, SOLUTION INTRAVENOUS at 12:29

## 2024-05-05 RX ADMIN — ONDANSETRON 4 MG: 2 INJECTION INTRAMUSCULAR; INTRAVENOUS at 14:11

## 2024-05-05 RX ADMIN — FENTANYL CITRATE 50 MCG: 50 INJECTION, SOLUTION INTRAMUSCULAR; INTRAVENOUS at 12:45

## 2024-05-05 RX ADMIN — HYDROCODONE BITARTRATE AND ACETAMINOPHEN 2 TABLET: 5; 325 TABLET ORAL at 05:47

## 2024-05-05 RX ADMIN — KETOROLAC TROMETHAMINE 15 MG: 30 INJECTION, SOLUTION INTRAMUSCULAR; INTRAVENOUS at 13:10

## 2024-05-05 RX ADMIN — TRANEXAMIC ACID 1000 MG: 10 INJECTION, SOLUTION INTRAVENOUS at 12:41

## 2024-05-05 RX ADMIN — Medication 100 MCG: at 13:10

## 2024-05-05 RX ADMIN — MORPHINE SULFATE 2 MG: 2 INJECTION, SOLUTION INTRAMUSCULAR; INTRAVENOUS at 11:38

## 2024-05-05 RX ADMIN — DIPHENHYDRAMINE HYDROCHLORIDE 12.5 MG: 50 INJECTION, SOLUTION INTRAMUSCULAR; INTRAVENOUS at 12:51

## 2024-05-05 RX ADMIN — HYDROCODONE BITARTRATE AND ACETAMINOPHEN 2 TABLET: 5; 325 TABLET ORAL at 18:20

## 2024-05-05 RX ADMIN — PROPOFOL 150 MG: 10 INJECTION, EMULSION INTRAVENOUS at 12:28

## 2024-05-05 RX ADMIN — WATER 2000 MG: 1 INJECTION INTRAMUSCULAR; INTRAVENOUS; SUBCUTANEOUS at 12:38

## 2024-05-05 RX ADMIN — DEXAMETHASONE SODIUM PHOSPHATE 8 MG: 4 INJECTION, SOLUTION INTRAMUSCULAR; INTRAVENOUS at 12:51

## 2024-05-05 RX ADMIN — SODIUM CHLORIDE, POTASSIUM CHLORIDE, SODIUM LACTATE AND CALCIUM CHLORIDE: 600; 310; 30; 20 INJECTION, SOLUTION INTRAVENOUS at 12:19

## 2024-05-05 ASSESSMENT — PAIN DESCRIPTION - FREQUENCY
FREQUENCY: CONTINUOUS
FREQUENCY: CONTINUOUS

## 2024-05-05 ASSESSMENT — PAIN DESCRIPTION - LOCATION
LOCATION: HIP

## 2024-05-05 ASSESSMENT — PAIN DESCRIPTION - ORIENTATION
ORIENTATION: RIGHT

## 2024-05-05 ASSESSMENT — PAIN DESCRIPTION - ONSET
ONSET: GRADUAL
ONSET: GRADUAL

## 2024-05-05 ASSESSMENT — PAIN DESCRIPTION - DESCRIPTORS
DESCRIPTORS: DISCOMFORT;TENDER;SORE
DESCRIPTORS: DISCOMFORT;SORE;TENDER
DESCRIPTORS: ACHING;SORE;THROBBING
DESCRIPTORS: DISCOMFORT;SORE;TENDER

## 2024-05-05 ASSESSMENT — PAIN SCALES - GENERAL
PAINLEVEL_OUTOF10: 7
PAINLEVEL_OUTOF10: 6
PAINLEVEL_OUTOF10: 7
PAINLEVEL_OUTOF10: 8
PAINLEVEL_OUTOF10: 7
PAINLEVEL_OUTOF10: 3

## 2024-05-05 ASSESSMENT — PAIN DESCRIPTION - PAIN TYPE
TYPE: ACUTE PAIN;SURGICAL PAIN

## 2024-05-05 ASSESSMENT — PAIN - FUNCTIONAL ASSESSMENT
PAIN_FUNCTIONAL_ASSESSMENT: PREVENTS OR INTERFERES SOME ACTIVE ACTIVITIES AND ADLS
PAIN_FUNCTIONAL_ASSESSMENT: ACTIVITIES ARE NOT PREVENTED
PAIN_FUNCTIONAL_ASSESSMENT: PREVENTS OR INTERFERES SOME ACTIVE ACTIVITIES AND ADLS
PAIN_FUNCTIONAL_ASSESSMENT: PREVENTS OR INTERFERES SOME ACTIVE ACTIVITIES AND ADLS
PAIN_FUNCTIONAL_ASSESSMENT: NONE - DENIES PAIN

## 2024-05-05 NOTE — OP NOTE
Operative Note      Patient: Barbara Isaacs  YOB: 1960  MRN: [id number]    Date of Procedure: 5/5/2024    Preprocedure diagnosis:  Right hip intertrochanteric hip fracture    Post-Op Diagnosis: Same       Procedure:  Right hip intramedullary nailing    Surgeon(s):  Joe Shi DO    Assistant:   Surgical Assistant: Vinnie Cummins    Anesthesia: General    Estimated Blood Loss (mL): less than 50     Complications: None    Specimens:   * No specimens in log *    Implants:  Implant Name Type Inv. Item Serial No.  Lot No. LRB No. Used Action   SCREW BNE L105MM DIA10.35MM G TI STEVENSON PERF FOR PROX FEM - [id number]  SCREW BNE L105MM DIA10.35MM G TI STEVENSON PERF FOR PROX FEM  DEPUY [employer] USA-WD [id number] Right 1 Implanted   NAIL IM L170MM [id number] 130DEG SHT PROX FEM GRN TI STEVENSON MARGAUX - [id number]  NAIL IM L170MM [id number] 130DEG SHT PROX FEM GRN TI STEVENSON MARGAUX  DEPUY [employer] USA-WD [id number] Right 1 Implanted   SCREW BNE L36MM DIA5MM TIB LT GRN TI ST STEVENSON DHIRAJ FULL THRD - [id number]  SCREW BNE L36MM DIA5MM TIB LT GRN TI ST STEVENSON DHIRAJ FULL THRD  DEPUY [employer] USA-WD [id number] Right 1 Implanted         Drains: * No LDAs found *    Findings:  Infection Present At Time Of Surgery (PATOS) (choose all levels that have infection present):  No infection present  Other Findings: Right intertrochanteric hip fracture    OPERATIVE COURSE: The patient was seen and identified outside the operative suite, in which the operative site was marked as appropriate by patient, surgeon, staff, and anesthesia. After intubation, patient was placed on the fracture table. All bony prominences and neurovascular structures were well padded and protexted.  Patient's well leg was flexed, abducted, and secured into a well-padded leg well. The operative leg was placed in gentle inline traction,.  After being positioned, a time out was performed indicating the appropriate identification of the patient, the procedure to be performed, and the

## 2024-05-05 NOTE — CONSULTS
Department of Orthopedic Surgery  Attending Consult Note          Reason for Consult: Right Hip Pain    HISTORY OF PRESENT ILLNESS:       Patient is a 63 y.o. female who presents with hip pain after a fall.  Patient denies any loss of consciousness.  Patient was walking down some steps and fell and slipped onto the right side.  She states she braced with her right arm.  Patient states mostly pain to the right hip today.  Patient denies any pain to the right wrist today.  Patient was placed into a splint for the right wrist.  Patient denies any previous hip pain before the fall.  No anticoagulation.  Denies numbness/tingling/paresthesias.  Denies any other orthopedic complaints at this time.     Past Medical History:    No past medical history on file.  Past Surgical History:    No past surgical history on file.  Current Medications:   Current Facility-Administered Medications: sodium chloride flush 0.9 % injection 10 mL, 10 mL, IntraVENous, 2 times per day  sodium chloride flush 0.9 % injection 10 mL, 10 mL, IntraVENous, PRN  0.9 % sodium chloride infusion, , IntraVENous, PRN  potassium chloride (KLOR-CON M) extended release tablet 40 mEq, 40 mEq, Oral, PRN **OR** potassium bicarb-citric acid (EFFER-K) effervescent tablet 40 mEq, 40 mEq, Oral, PRN **OR** potassium chloride 10 mEq/100 mL IVPB (Peripheral Line), 10 mEq, IntraVENous, PRN  magnesium sulfate 2000 mg in 50 mL IVPB premix, 2,000 mg, IntraVENous, PRN  ondansetron (ZOFRAN-ODT) disintegrating tablet 4 mg, 4 mg, Oral, Q8H PRN **OR** ondansetron (ZOFRAN) injection 4 mg, 4 mg, IntraVENous, Q6H PRN  senna (SENOKOT) tablet 8.6 mg, 1 tablet, Oral, Daily PRN  acetaminophen (TYLENOL) tablet 650 mg, 650 mg, Oral, Q6H PRN **OR** acetaminophen (TYLENOL) suppository 650 mg, 650 mg, Rectal, Q6H PRN  HYDROcodone-acetaminophen (NORCO) 5-325 MG per tablet 2 tablet, 2 tablet, Oral, Q6H PRN  morphine injection 2 mg, 2 mg, IntraMUSCular, Q4H PRN **OR** morphine sulfate (PF)

## 2024-05-05 NOTE — ED PROVIDER NOTES
done, ED Course, Reassessment, disposition considerations/shared decision making with patient, consults, disposition:            Medical Decision Making  Amount and/or Complexity of Data Reviewed  Labs: ordered.  Radiology: ordered.    Risk  Prescription drug management.  Decision regarding hospitalization.        This is a 63-year-old female presents to the ED after a fall.  On arrival to the ED patient's vital signs are stable.  Exam patient did have pain to palpation to right hip and right wrist.  Patient was given Norco and Norflex for pain control.  Patient underwent x-ray imaging which showed a normal chest x-ray.  Right hip x-ray shows an intertrochanteric fracture that is nondisplaced.  No abnormality of the right ankle.  Right wrist does show an irregularity of the scaphoid concerning for a nondisplaced fracture as well as a fragment that is behind the carpal bones concerning for a possible triquetral fracture.  Therefore patient was placed in a thumb spica by nursing staff.  Orthopedic surgery was consulted.  I spoke to Dr. Shi who recommended keeping the patient n.p.o.  Patient's case then discussed with Laura who is on-call for Dr. Montes who is excepted patient for admission.    CONSULTS: (Who and What was discussed)  IP CONSULT TO ORTHOPEDIC SURGERY  IP CONSULT TO INTERNAL MEDICINE        I am the Primary Clinician of Record.    FINAL IMPRESSION      1. Fall, initial encounter    2. Closed nondisplaced intertrochanteric fracture of right femur, initial encounter ([facility])    3. Closed nondisplaced fracture of scaphoid of right wrist, unspecified portion of scaphoid, initial encounter          DISPOSITION/PLAN     DISPOSITION Admitted 05/05/2024 01:26:03 AM      PATIENT REFERRED TO:  No follow-up provider specified.    DISCHARGE MEDICATIONS:  New Prescriptions    No medications on file       DISCONTINUED MEDICATIONS:  Discontinued Medications    No medications on file              (Please note that

## 2024-05-05 NOTE — PROGRESS NOTES
Nursing Transfer Note    Data:  Summary of patients progress: s/p R hip ORIF  Reason for transfer: inpatient hospital stay - pacu discharge criteria met     Action:  Explained reason for transfer to Patient.  Report given to: Henny JEFFERS RN, using RN Handoff Navigator.  Mode of transportation: cart    Response:  RN Recommendations: see notes/orders/MAR

## 2024-05-05 NOTE — PROGRESS NOTES
Educated patient on the importance of incentive spirometer, patient returned demonstration of 2000. Patient tolerated well.    Electronically signed by Shirin Pozo RN on 5/5/2024 at 7:03 PM

## 2024-05-05 NOTE — ED NOTES
ED to Inpatient Handoff Report    Notified Izzy  that electronic handoff available and patient ready for transport to room 733.    Safety Risks: Risk of falls    Patient in Restraints: no    Constant Observer or Patient : no    Telemetry Monitoring Ordered :No           Order to transfer to unit without monitor:NO    Last MEWS: 1 Time completed: 0147    Deterioration Index Score:   Predictive Model Details          17 (Normal)  Factor Value    Calculated 5/5/2024 01:51 92% Age 63 years old    Deterioration Index Model 8% Systolic 124     0% Pulse oximetry 95 %     0% Respiratory rate 16     0% Pulse 74     0% Temperature 98.4 °F (36.9 °C)        Vitals:    05/04/24 2248 05/04/24 2251 05/05/24 0147   BP:  (!) 129/91 124/79   Pulse:  73 74   Resp:  18 16   Temp:  97.5 °F (36.4 °C) 98.4 °F (36.9 °C)   TempSrc:  Oral    SpO2:  100% 95%   Weight: 78.9 kg (174 lb)     Height: 1.822 m (5' 11.75\")           Opportunity for questions and clarification was provided.

## 2024-05-05 NOTE — DISCHARGE INSTRUCTIONS
[facility] Orthopedic Surgery  [address] , [address]  [place] [address]    Or    [address] [place] [address]    Dr. Joe Shi, DO    Orthopaedics Discharge Instructions   Weight bearing Status - Weight bearing as tolerated - on right lower Extremity  Pain medication Per Prescriptions  Contact Office for Medication Refill-  [phone]  Office can refill pain med every 7 days  If patient discharging to facility then pain control will be continued per facility physician  Ice to operative/injured site for 15-30 minutes of each hour for next 5 days    Recommend that you continue to ice the area 2-3 times per day after this   Elevate operative/injured limb on 2 pillows at home  Goal is to have limb above the heart if able  Continue DVT Prophylaxis (blood clot prevention) as Prescribed: Aspirin 81 mg twice daily  Wound care - Keep Aquacel dressing in place until postoperative day #7. Can just peel off.  If wound is clean & dry, may leave open to air. If not, keep covered with dry dressing until wound is dry   Follow Up in Office in 10-14 days. Call Office to Confirm Appointment time and Location - [phone]    Call the office at [phone] for directions or with any questions.  Watch for these significant complications.  Call physician if they or any other problems occur:  Fever over 101°, redness, swelling or warmth at the operative site  Unrelieved nausea    Foul smelling or cloudy drainage at the operative site   Unrelieved pain    Blood soaked dressing. (Some oozing may be normal)     Numb, pale, blue, cold or tingling extremity

## 2024-05-05 NOTE — PROGRESS NOTES
4 Eyes Skin Assessment     NAME:  Barbara Isaacs  YOB: 1960  MEDICAL RECORD NUMBER:  [id number]    The patient is being assessed for  Admission    I agree that at least one RN has performed a thorough Head to Toe Skin Assessment on the patient. ALL assessment sites listed below have been assessed.      Areas assessed by both nurses:    Head, Face, Ears, Shoulders, Back, Chest, Arms, Elbows, Hands, Sacrum. Buttock, Coccyx, Ischium, Legs. Feet and Heels, and Under Medical Devices         Does the Patient have a Wound? No noted wound(s)       Brayden Prevention initiated by RN: No  Wound Care Orders initiated by RN: No    Pressure Injury (Stage 3,4, Unstageable, DTI, NWPT, and Complex wounds) if present, place Wound referral order by RN under : No    New Ostomies, if present place, Ostomy referral order under : No     Nurse 1 eSignature: Electronically signed by Wicho Cowart RN on 5/5/24 at 5:17 AM EDT    **SHARE this note so that the co-signing nurse can place an eSignature**    Nurse 2 eSignature: Electronically signed by Mindy Bullock RN on 5/5/24 at 5:17 AM EDT

## 2024-05-05 NOTE — CARE COORDINATION
Internal Medicine On-call Care Coordination Note    I was called by the ED physician because they recommended admission for this patient and we cover their PCP.  The history as I understand it after discussion with the ED physician is as follows:    Fell down the steps onto R side, complaints of R hip pain  Found to have R hip fx  Ortho plans to take to surgery in am per ED    I placed admission orders.  Including:    NPO  Held ishan Montes,, or our coverage will see the patient tomorrow for H&P.    Electronically signed by RICH Spear CNP on 5/5/2024 at 1:39 AM

## 2024-05-05 NOTE — H&P
Internal Medicine History & Physical     Name: Barbara Isaacs  : 1960  Chief Complaint: Fall and Hip Pain (Fell down 3 steps onto concrete: Hit Right knee and right hip; abrasion on right hand. Denies LOC, Did not hit head and is not on blood thinners)  Primary Care Physician: Bo Cui Jr., MD  Admission date: 2024  Date of service: 2024     History of Present Illness  Barbara is a 63 y.o. year old female.  Patient states that she fell down 3 steps on the concrete at her aunts house around 11 PM.  Patient states that she did not have any loss of consciousness.  Did not injure anywhere else.  No fevers, chills, headache, vision changes, dysuria, hematuria, constipation, diarrhea, chest pain, shortness of breath.  Denies any other medical issues.  Does not take medications.  Denies any allergies to medications.  Patient states that she was unable to stand up after falling.  Still having significant pain in the anterior hip with any movement or palpation.    Patient is a good historian.      ED course:   Initial blood work and imaging studies performed. Admission recommended by ED physician. Case was discussed with ED provider. Meds in ED consisted of the following:  Medications   sodium chloride flush 0.9 % injection 10 mL (10 mLs IntraVENous Not Given 24 0956)   sodium chloride flush 0.9 % injection 10 mL (has no administration in time range)   0.9 % sodium chloride infusion (has no administration in time range)   potassium chloride (KLOR-CON M) extended release tablet 40 mEq (has no administration in time range)     Or   potassium bicarb-citric acid (EFFER-K) effervescent tablet 40 mEq (has no administration in time range)     Or   potassium chloride 10 mEq/100 mL IVPB (Peripheral Line) (has no administration in time range)   magnesium sulfate 2000 mg in 50 mL IVPB premix (has no administration in time range)   ondansetron (ZOFRAN-ODT) disintegrating tablet 4 mg (has no administration in

## 2024-05-06 VITALS
BODY MASS INDEX: 23.57 KG/M2 | SYSTOLIC BLOOD PRESSURE: 108 MMHG | HEIGHT: 72 IN | HEART RATE: 72 BPM | RESPIRATION RATE: 20 BRPM | TEMPERATURE: 98.2 F | DIASTOLIC BLOOD PRESSURE: 64 MMHG | OXYGEN SATURATION: 96 % | WEIGHT: 174 LBS

## 2024-05-06 LAB
ALBUMIN SERPL-MCNC: 4 G/DL (ref 3.5–5.2)
ALP SERPL-CCNC: 59 U/L (ref 35–104)
ALT SERPL-CCNC: 19 U/L (ref 0–32)
ANION GAP SERPL CALCULATED.3IONS-SCNC: 8 MMOL/L (ref 7–16)
AST SERPL-CCNC: 18 U/L (ref 0–31)
BASOPHILS # BLD: 0.01 K/UL (ref 0–0.2)
BASOPHILS NFR BLD: 0 % (ref 0–2)
BILIRUB SERPL-MCNC: 0.5 MG/DL (ref 0–1.2)
BUN SERPL-MCNC: 17 MG/DL (ref 6–23)
CALCIUM SERPL-MCNC: 8.6 MG/DL (ref 8.6–10.2)
CHLORIDE SERPL-SCNC: 101 MMOL/L (ref 98–107)
CO2 SERPL-SCNC: 27 MMOL/L (ref 22–29)
CREAT SERPL-MCNC: 0.7 MG/DL (ref 0.5–1)
CREAT SERPL-MCNC: 0.7 MG/DL (ref 0.5–1)
EOSINOPHIL # BLD: 0 K/UL (ref 0.05–0.5)
EOSINOPHILS RELATIVE PERCENT: 0 % (ref 0–6)
GFR, ESTIMATED: 90 ML/MIN/1.73M2
GFR, ESTIMATED: >90 ML/MIN/1.73M2
GLUCOSE SERPL-MCNC: 122 MG/DL (ref 74–99)
HCT VFR BLD AUTO: 36.3 % (ref 34–48)
HGB BLD-MCNC: 11.8 G/DL (ref 11.5–15.5)
IMM GRANULOCYTES # BLD AUTO: 0.04 K/UL (ref 0–0.58)
IMM GRANULOCYTES NFR BLD: 0 % (ref 0–5)
LYMPHOCYTES NFR BLD: 1.08 K/UL (ref 1.5–4)
LYMPHOCYTES RELATIVE PERCENT: 11 % (ref 20–42)
MCH RBC QN AUTO: 31.4 PG (ref 26–35)
MCHC RBC AUTO-ENTMCNC: 32.5 G/DL (ref 32–34.5)
MCV RBC AUTO: 96.5 FL (ref 80–99.9)
MONOCYTES NFR BLD: 0.71 K/UL (ref 0.1–0.95)
MONOCYTES NFR BLD: 7 % (ref 2–12)
NEUTROPHILS NFR BLD: 82 % (ref 43–80)
NEUTS SEG NFR BLD: 8.21 K/UL (ref 1.8–7.3)
PLATELET # BLD AUTO: 233 K/UL (ref 130–450)
PMV BLD AUTO: 10.1 FL (ref 7–12)
POTASSIUM SERPL-SCNC: 4.4 MMOL/L (ref 3.5–5)
PROT SERPL-MCNC: 6.2 G/DL (ref 6.4–8.3)
RBC # BLD AUTO: 3.76 M/UL (ref 3.5–5.5)
SODIUM SERPL-SCNC: 136 MMOL/L (ref 132–146)
WBC OTHER # BLD: 10.1 K/UL (ref 4.5–11.5)

## 2024-05-06 PROCEDURE — 97161 PT EVAL LOW COMPLEX 20 MIN: CPT

## 2024-05-06 PROCEDURE — 97165 OT EVAL LOW COMPLEX 30 MIN: CPT

## 2024-05-06 PROCEDURE — 6360000002 HC RX W HCPCS: Performed by: STUDENT IN AN ORGANIZED HEALTH CARE EDUCATION/TRAINING PROGRAM

## 2024-05-06 PROCEDURE — 2580000003 HC RX 258: Performed by: STUDENT IN AN ORGANIZED HEALTH CARE EDUCATION/TRAINING PROGRAM

## 2024-05-06 PROCEDURE — 6370000000 HC RX 637 (ALT 250 FOR IP): Performed by: STUDENT IN AN ORGANIZED HEALTH CARE EDUCATION/TRAINING PROGRAM

## 2024-05-06 PROCEDURE — 85025 COMPLETE CBC W/AUTO DIFF WBC: CPT

## 2024-05-06 PROCEDURE — 97530 THERAPEUTIC ACTIVITIES: CPT

## 2024-05-06 PROCEDURE — 80053 COMPREHEN METABOLIC PANEL: CPT

## 2024-05-06 RX ORDER — HYDROCODONE BITARTRATE AND ACETAMINOPHEN 5; 325 MG/1; MG/1
2 TABLET ORAL EVERY 6 HOURS PRN
Qty: 20 TABLET | Refills: 0 | Status: SHIPPED | OUTPATIENT
Start: 2024-05-06 | End: 2024-05-11

## 2024-05-06 RX ORDER — ASPIRIN 81 MG/1
81 TABLET ORAL 2 TIMES DAILY
Qty: 30 TABLET | Refills: 1 | Status: SHIPPED | OUTPATIENT
Start: 2024-05-06

## 2024-05-06 RX ORDER — SENNOSIDES A AND B 8.6 MG/1
1 TABLET, FILM COATED ORAL DAILY PRN
Qty: 30 TABLET | Refills: 0 | Status: SHIPPED | OUTPATIENT
Start: 2024-05-06 | End: 2024-06-05

## 2024-05-06 RX ADMIN — WATER 2000 MG: 1 INJECTION INTRAMUSCULAR; INTRAVENOUS; SUBCUTANEOUS at 05:51

## 2024-05-06 RX ADMIN — HYDROCODONE BITARTRATE AND ACETAMINOPHEN 2 TABLET: 5; 325 TABLET ORAL at 08:05

## 2024-05-06 RX ADMIN — ASPIRIN 81 MG: 81 TABLET, COATED ORAL at 08:05

## 2024-05-06 RX ADMIN — HYDROCODONE BITARTRATE AND ACETAMINOPHEN 2 TABLET: 5; 325 TABLET ORAL at 02:03

## 2024-05-06 RX ADMIN — SODIUM CHLORIDE, PRESERVATIVE FREE 10 ML: 5 INJECTION INTRAVENOUS at 08:05

## 2024-05-06 ASSESSMENT — PAIN SCALES - GENERAL
PAINLEVEL_OUTOF10: 7
PAINLEVEL_OUTOF10: 3
PAINLEVEL_OUTOF10: 3
PAINLEVEL_OUTOF10: 4
PAINLEVEL_OUTOF10: 6

## 2024-05-06 ASSESSMENT — PAIN DESCRIPTION - ORIENTATION
ORIENTATION: RIGHT

## 2024-05-06 ASSESSMENT — PAIN - FUNCTIONAL ASSESSMENT
PAIN_FUNCTIONAL_ASSESSMENT: ACTIVITIES ARE NOT PREVENTED
PAIN_FUNCTIONAL_ASSESSMENT: ACTIVITIES ARE NOT PREVENTED

## 2024-05-06 ASSESSMENT — PAIN DESCRIPTION - PAIN TYPE
TYPE: SURGICAL PAIN
TYPE: SURGICAL PAIN

## 2024-05-06 ASSESSMENT — PAIN DESCRIPTION - LOCATION
LOCATION: HIP

## 2024-05-06 ASSESSMENT — PAIN DESCRIPTION - DESCRIPTORS
DESCRIPTORS: ACHING;DISCOMFORT;SORE
DESCRIPTORS: ACHING;DISCOMFORT
DESCRIPTORS: ACHING;DISCOMFORT
DESCRIPTORS: ACHING;DISCOMFORT;SORE

## 2024-05-06 NOTE — PROGRESS NOTES
Dr. Shi aware of patient's DC home today with [facility] [place].

## 2024-05-06 NOTE — CARE COORDINATION
Social Work:    [facility] accepted.    Electronically signed by DEON Huffman on 5/6/2024 at 11:24 AM

## 2024-05-06 NOTE — PLAN OF CARE
Problem: Discharge Planning  Goal: Discharge to home or other facility with appropriate resources  5/6/2024 1312 by Sofi Ham RN  Outcome: Completed  5/6/2024 1312 by Sofi Ham RN  Outcome: Adequate for Discharge  5/6/2024 0809 by Sofi Ham RN  Outcome: Progressing     Problem: Pain  Goal: Verbalizes/displays adequate comfort level or baseline comfort level  5/6/2024 1312 by Sofi Ham RN  Outcome: Completed  5/6/2024 1312 by Sofi Ham RN  Outcome: Adequate for Discharge  5/6/2024 0809 by Sofi Ham RN  Outcome: Progressing     Problem: Skin/Tissue Integrity  Goal: Absence of new skin breakdown  Description: 1.  Monitor for areas of redness and/or skin breakdown  2.  Assess vascular access sites hourly  3.  Every 4-6 hours minimum:  Change oxygen saturation probe site  4.  Every 4-6 hours:  If on nasal continuous positive airway pressure, respiratory therapy assess nares and determine need for appliance change or resting period.  5/6/2024 1312 by Sofi Ham RN  Outcome: Completed  5/6/2024 1312 by Sofi Ham RN  Outcome: Adequate for Discharge  5/6/2024 0809 by Sofi Ham RN  Outcome: Progressing     Problem: Safety - Adult  Goal: Free from fall injury  5/6/2024 1312 by Sofi Ham RN  Outcome: Completed  5/6/2024 1312 by Sofi Ham RN  Outcome: Adequate for Discharge  5/6/2024 0809 by Sofi Ham RN  Outcome: Progressing     Problem: ABCDS Injury Assessment  Goal: Absence of physical injury  5/6/2024 1312 by Sofi Ham RN  Outcome: Completed  5/6/2024 1312 by Sofi Ham RN  Outcome: Adequate for Discharge  5/6/2024 0809 by Sofi Ham RN  Outcome: Progressing

## 2024-05-06 NOTE — PROGRESS NOTES
1325 - Discharge instructions reviewed with patient, including how/when to schedule follow up appts with Dr Shi and PCP Dr Cui. Patient has her filled prescriptions in her possession, as well as her walker. Patient verbalized understanding of all discharge instructions and denied questions.     Awaiting wheelchair for discharge transport.     CONSTANCE Ham RN

## 2024-05-06 NOTE — PROGRESS NOTES
Occupational Therapy    OCCUPATIONAL THERAPY INITIAL EVALUATION    [facility]   [address] [place] [address], [place]         Date:2024                                                  Patient Name: Barbara Isaacs    MRN: [id number]    : 1960    Room: [address] [place] [address]      Evaluating OT: Mercedez Weber OTR/L   [id number]      Referring Provider:Joe Shi DO     Specific Provider Orders/Date:OT eval and treat 2024       Diagnosis:  Closed nondisplaced intertrochanteric fracture of right femur, initial encounter ([facility]) [S72.144A]  Fall, initial encounter [W19.XXXA]  Intertrochanteric fracture of right femur, closed, initial encounter ([facility]) [S72.141A]  Closed nondisplaced fracture of scaphoid of right wrist, unspecified portion of scaphoid, initial encounter [S62.001A]  X-rays of the right wrist demonstrate no fractures dislocations, mild midcarpal osteoarthritis, mild CMC OA     Procedure: RIGHT HIP OPEN REDUCTION INTERNAL FIXATION (Right: Hip)      Pertinent Medical History:  No past medical history on file.    Precautions:  Fall Risk, WBAT R LE,     Assessment of current deficits    [x] Functional mobility  [x]ADLs  [x] Strength               [x]Cognition    [x] Functional transfers   [x] IADLs         [x] Safety Awareness   [x]Endurance    [x] Fine Coordination              [x] Balance      [] Vision/perception   [x]Sensation     []Gross Motor Coordination  [] ROM  [] Delirium                   [] Motor Control     OT PLAN OF CARE   OT POC based on physician orders, patient diagnosis and results of clinical assessment    Frequency/Duration  2-3 days/wk for 2 - 4weeks PRN   Specific OT Treatment Interventions to include:   ADL retraining/adapted techniques and AE recommendations to increase functional independence within precautions                    Energy conservation techniques to improve tolerance for selfcare routine   Functional transfer/mobility

## 2024-05-06 NOTE — CARE COORDINATION
Social Work:    Chart reviewed. Barbara underwent a right ORIF yesterday after a fall at home. Social service met with Barbara, advised her about social work role and discussed discharge planning. Barbara is a patient of Dr. Cui. She resides alone in a one floor condo with a high commode, walker-in shower set-up.  Barbara will have assistance from her daughter and would like a wheeled walker from [facility] and [facility] with [facility] home care. Social work called a referral to [facility] at [facility] for discharge home today.    Electronically signed by DEON Huffman on 5/6/2024 at 9:43 AM

## 2024-05-06 NOTE — PLAN OF CARE
Problem: Discharge Planning  Goal: Discharge to home or other facility with appropriate resources  5/5/2024 2215 by Mindy Bullock RN  Outcome: Progressing  5/5/2024 0946 by Shirin Pozo RN  Outcome: Progressing     Problem: Pain  Goal: Verbalizes/displays adequate comfort level or baseline comfort level  5/5/2024 2215 by Mindy Bullock RN  Outcome: Progressing  5/5/2024 0946 by Shirin Pozo RN  Outcome: Progressing     Problem: Skin/Tissue Integrity  Goal: Absence of new skin breakdown  Description: 1.  Monitor for areas of redness and/or skin breakdown  2.  Assess vascular access sites hourly  3.  Every 4-6 hours minimum:  Change oxygen saturation probe site  4.  Every 4-6 hours:  If on nasal continuous positive airway pressure, respiratory therapy assess nares and determine need for appliance change or resting period.  5/5/2024 2215 by Mindy Bullock RN  Outcome: Progressing  5/5/2024 0946 by Shirin Pozo RN  Outcome: Progressing     Problem: Safety - Adult  Goal: Free from fall injury  5/5/2024 2215 by Mindy Bullock RN  Outcome: Progressing  5/5/2024 0946 by Shirin Pozo RN  Outcome: Progressing     Problem: ABCDS Injury Assessment  Goal: Absence of physical injury  5/5/2024 2215 by Mindy Bullock RN  Outcome: Progressing  5/5/2024 0946 by Shirin Pozo RN  Outcome: Progressing

## 2024-05-06 NOTE — PROGRESS NOTES
P Quality Flow/Interdisciplinary Rounds Progress Note        Quality Flow Rounds held on May 6, 2024    Disciplines Attending:  Bedside Nurse, , , and Nursing Unit Leadership    Barbara Isaacs was admitted on 5/4/2024 11:07 PM    Anticipated Discharge Date:  Expected Discharge Date: 05/06/24    Disposition:    Brayden Score:  Brayden Scale Score: 20    Readmission Risk              Risk of Unplanned Readmission:  7           Discussed patient goal for the day, patient clinical progression, and barriers to discharge.  The following Goal(s) of the Day/Commitment(s) have been identified:   PT/OT, discharge planning      Jarrell Robertson RN  May 6, 2024

## 2024-05-06 NOTE — PROGRESS NOTES
Internal Medicine Progress Note    Patient's name: Barbara Isaacs  : 1960  Chief complaints (on day of admission): Fall and Hip Pain (Fell down 3 steps onto concrete: Hit Right knee and right hip; abrasion on right hand. Denies LOC, Did not hit head and is not on blood thinners)  Admission date: 2024  Date of service: 2024   Room: [address] SURG  Primary care physician: Bo Cui Jr., MD  Reason for visit: Follow-up for femur fx    Subjective  Barbara was seen and examined. She was lying in bed and resting comfortably.  Her pain is tolerable.  She adamantly refuses to go to rehab.  PT/OT evaluations pending.    Review of Systems  There are no new complaints of chest pain, shortness of breath, abdominal pain, nausea, vomiting, diarrhea, constipation.    Hospital Medications  Current Facility-Administered Medications   Medication Dose Route Frequency Provider Last Rate Last Admin    potassium chloride (KLOR-CON M) extended release tablet 40 mEq  40 mEq Oral PRN Jose Guadalupe Joe L, DO        Or    potassium bicarb-citric acid (EFFER-K) effervescent tablet 40 mEq  40 mEq Oral PRN Jose Guadalupe Joe L, DO        Or    potassium chloride 10 mEq/100 mL IVPB (Peripheral Line)  10 mEq IntraVENous PRN Jose Guadalupe Joe L, DO        magnesium sulfate 2000 mg in 50 mL IVPB premix  2,000 mg IntraVENous PRN Jose Guadalupe Joe L, DO        ondansetron (ZOFRAN-ODT) disintegrating tablet 4 mg  4 mg Oral Q8H PRN Jose Guadalupe Joe L, DO        Or    ondansetron (ZOFRAN) injection 4 mg  4 mg IntraVENous Q6H PRN Jose Guadalupe Joe L, DO        senna (SENOKOT) tablet 8.6 mg  1 tablet Oral Daily PRN Jose Guadalupe Joe L, DO        acetaminophen (TYLENOL) tablet 650 mg  650 mg Oral Q6H PRN Jose Guadalupe Joe L, DO        Or    acetaminophen (TYLENOL) suppository 650 mg  650 mg Rectal Q6H PRN Jose Guadalupe Joe L, DO        HYDROcodone-acetaminophen (NORCO) 5-325 MG per tablet 2 tablet  2 tablet Oral Q6H PRN Abbie Shirt L, DO   2 tablet at 24

## 2024-05-06 NOTE — PROGRESS NOTES
Department of Orthopedic Surgery  Progress Note    Patient seen and examined. Pain controlled. No new complaints.  Denies chest pain, shortness of breath, dizziness/lightheadedness.  Patient was up to the bathroom yesterday with a walker.  Patient sitting only mild pain to the right hip.    VITALS:  /72   Pulse 66   Temp 98.2 °F (36.8 °C) (Oral)   Resp 20   Ht 1.822 m (5' 11.75\")   Wt 78.9 kg (174 lb)   SpO2 94%   BMI 23.76 kg/m²     General: alert and oriented to person, place and time, well-developed and well-nourished, in no acute distress    MUSCULOSKELETAL:   right lower extremity:  Dressing C/D/I  Compartments soft and compressible  +PF/DF/EHL  +2/4 DP & PT pulses, Brisk Cap refill, Toes warm and perfused  Distal sensation grossly intact to Peroneals, Sural, Saphenous, and tibial nrs    CBC:   Lab Results   Component Value Date/Time    WBC 10.1 05/06/2024 04:40 AM    HGB 11.8 05/06/2024 04:40 AM    HCT 36.3 05/06/2024 04:40 AM     05/06/2024 04:40 AM     PT/INR:    Lab Results   Component Value Date/Time    PROTIME 10.9 05/05/2024 01:29 AM    INR 1.0 05/05/2024 01:29 AM         ASSESSMENT  S/P right hip intramedullary nailing-5/5/2024    PLAN      Continue physical therapy and protocol: WBAT - RLE  24 hour abx coverage  Deep venous thrombosis prophylaxis -aspirin 81 mg twice daily, early mobilization  PT/OT  Pain Control: IV and PO  Monitor H&H  D/C Plan: Pending

## 2024-05-06 NOTE — DISCHARGE SUMMARY
Internal Medicine Discharge Summary    NAME: Barbara Isaacs :  1960  MRN:  [id number] PCP:Bo Cui Jr., MD    ADMITTED: 2024   DISCHARGED: No discharge date for patient encounter.    ADMITTING PHYSICIAN: Lyle Montes DO    PCP: Bo Cui Jr., MD    CONSULTANT(S):   IP CONSULT TO ORTHOPEDIC SURGERY  IP CONSULT TO INTERNAL MEDICINE  IP CONSULT TO CASE MANAGEMENT     ADMITTING DIAGNOSIS:   Closed nondisplaced intertrochanteric fracture of right femur, initial encounter (HCC) [S72.144A]  Fall, initial encounter [W19.XXXA]  Intertrochanteric fracture of right femur, closed, initial encounter (HCC) [S72.141A]  Closed nondisplaced fracture of scaphoid of right wrist, unspecified portion of scaphoid, initial encounter [S62.001A]     Please see H&P for further details    DISCHARGE DIAGNOSES:   Active Hospital Problems    Diagnosis     Intertrochanteric fracture of right femur, closed, initial encounter (HCC) [S72.141A]      Priority: High    Osteoarthritis of left knee [M17.12]        BRIEF HISTORY OF PRESENT ILLNESS: Barbara Isaacs is a 63 y.o. female patient of Bo Cui Jr., MD who  has no past medical history on file. who originally had concerns including Fall and Hip Pain (Fell down 3 steps onto concrete: Hit Right knee and right hip; abrasion on right hand. Denies LOC, Did not hit head and is not on blood thinners). at presentation on 2024, and was found to have Closed nondisplaced intertrochanteric fracture of right femur, initial encounter ([facility]) [S72.144A]  Fall, initial encounter [W19.XXXA]  Intertrochanteric fracture of right femur, closed, initial encounter ([facility]) [S72.141A]  Closed nondisplaced fracture of scaphoid of right wrist, unspecified portion of scaphoid, initial encounter [S62.001A] after workup.    Please see H&P for further details.    HOSPITAL COURSE:   The patient presented to the hospital with the chief complaint of Fall and Hip Pain (Fell down 3 steps onto

## 2024-05-06 NOTE — PLAN OF CARE
Problem: Discharge Planning  Goal: Discharge to home or other facility with appropriate resources  5/6/2024 0809 by Sofi Ham RN  Outcome: Progressing  5/5/2024 2215 by Mnidy Bullock RN  Outcome: Progressing     Problem: Pain  Goal: Verbalizes/displays adequate comfort level or baseline comfort level  5/6/2024 0809 by Sofi Ham RN  Outcome: Progressing  5/5/2024 2215 by Mindy Bullock RN  Outcome: Progressing     Problem: Skin/Tissue Integrity  Goal: Absence of new skin breakdown  Description: 1.  Monitor for areas of redness and/or skin breakdown  2.  Assess vascular access sites hourly  3.  Every 4-6 hours minimum:  Change oxygen saturation probe site  4.  Every 4-6 hours:  If on nasal continuous positive airway pressure, respiratory therapy assess nares and determine need for appliance change or resting period.  5/6/2024 0809 by Sofi Ham RN  Outcome: Progressing  5/5/2024 2215 by Mindy Bullock RN  Outcome: Progressing     Problem: Safety - Adult  Goal: Free from fall injury  5/6/2024 0809 by Sofi Ham RN  Outcome: Progressing  5/5/2024 2215 by Mindy Bullock RN  Outcome: Progressing     Problem: ABCDS Injury Assessment  Goal: Absence of physical injury  5/6/2024 0809 by Sofi Ham RN  Outcome: Progressing  5/5/2024 2215 by Mindy Bullock RN  Outcome: Progressing

## 2024-05-06 NOTE — PROGRESS NOTES
Physical Therapy  Facility/Department: [address] MED SURG  Physical Therapy Initial Assessment    Name: Barbara Isaacs  : 1960  MRN: [id number]  Date of Service: 2024          Patient Diagnosis(es): The primary encounter diagnosis was Fall, initial encounter. Diagnoses of Closed nondisplaced intertrochanteric fracture of right femur, initial encounter (HCC), Closed nondisplaced fracture of scaphoid of right wrist, unspecified portion of scaphoid, initial encounter, and Intertrochanteric fracture of right femur, closed, initial encounter (HCC) were also pertinent to this visit.  Past Medical History:  has no past medical history on file.  Past Surgical History:  has a past surgical history that includes hip surgery (Right, 2024).         Requires PT Follow-Up: Yes     Evaluating Therapist: Julita Peña PT     Rec ww     Referring Provider:      Joe Shi DO       PT order : PT eval and treat     Room #: 733  DIAGNOSIS: The primary encounter diagnosis was Fall, initial encounter. Diagnoses of Closed nondisplaced intertrochanteric fracture of right femur, initial encounter (HCC), Closed nondisplaced fracture of scaphoid of right wrist, unspecified portion of scaphoid, initial encounter, and Intertrochanteric fracture of right femur, closed, initial encounter (HCC) were also pertinent to this visit.  Additional Pertinent History:R hip IMN    PRECAUTIONS:  falls , WBAT     Social:  Pt lives alone  in a  1  floor plan  no  steps  to enter.  Prior to admission pt walked with  no AD, independent      Initial Evaluation  Date:  2024  Treatment      Short Term/ Long Term   Goals   Was pt agreeable to Eval/treatment?  Yes      Does pt have pain?  Manageable R hip pain      Bed Mobility  Rolling:  NT   Supine to sit:  Supervision    Sit to supine:  NT   Scooting: independent in sit    Independent    Transfers Sit to stand:  S/SBA   Stand to sit:  S/SBA   Stand pivot:  NT    Independent

## 2024-05-06 NOTE — ACP (ADVANCE CARE PLANNING)
Advance Care Planning   Healthcare Decision Maker:    Primary Decision Maker: SURAJ CAST [facility] - [phone]    Click here to complete Healthcare Decision Makers including selection of the Healthcare Decision Maker Relationship (ie \"Primary\").

## 2024-05-07 LAB
MICROORGANISM SPEC CULT: NORMAL
SPECIMEN DESCRIPTION: NORMAL

## 2024-05-07 NOTE — PROGRESS NOTES
CLINICAL PHARMACY NOTE: MEDS TO BEDS    Total # of Prescriptions Filled: 3   The following medications were delivered to the patient:  Norco 5/ 325 mg  Senna 8.6 mg  Aspirin 81 mg    Additional Documentation:

## (undated) DEVICE — SOLUTION IRRIG 1000ML 0.9% SOD CHL USP POUR PLAS BTL

## (undated) DEVICE — Device

## (undated) DEVICE — SYRINGE 20ML LL S/C 50

## (undated) DEVICE — SYRINGE IRRIG 60ML SFT PLIABLE BLB EZ TO GRP 1 HND USE W/

## (undated) DEVICE — SPONGE LAP W18XL18IN WHT COT 4 PLY FLD STRUNG RADPQ DISP ST 2 PER PACK

## (undated) DEVICE — GLOVE ORANGE PI 7 1/2   MSG9075

## (undated) DEVICE — 3M™ COBAN™ NL STERILE NON-LATEX SELF-ADHERENT WRAP, 2084S, 4 IN X 5 YD (10 CM X 4,5 M), 18 ROLLS/CASE: Brand: 3M™ COBAN™

## (undated) DEVICE — GLOVE ORANGE PI 8   MSG9080

## (undated) DEVICE — DRAPE,REIN 53X77,STERILE: Brand: MEDLINE

## (undated) DEVICE — TAPE ADH W3INXL10YD WHT COT WVN BK POWERFUL RUB BASE HIGHLY

## (undated) DEVICE — DRAPE ISOLATN PT ST W/POCKET

## (undated) DEVICE — DRESSING HYDROFIBER AQUACEL AG ADVANTAGE 3.5X6 IN

## (undated) DEVICE — BIT DRL L L266MM DIA16MM FEM FLX CANN QUIK CPL

## (undated) DEVICE — BIT DRL L500MM DIA6X9MM CANN STP L QUIK CPL FOR DH DC TFN

## (undated) DEVICE — Device: Brand: COVER, PERINEAL POST, 12 PK

## (undated) DEVICE — Device: Brand: PROTECTORS, LEG SPAR BALL JOINT, 12/PR

## (undated) DEVICE — GUIDEWIRE ORTH L400MM DIA3.2MM FOR TFN

## (undated) DEVICE — 4-PORT MANIFOLD: Brand: NEPTUNE 2

## (undated) DEVICE — DOUBLE BASIN SET: Brand: MEDLINE INDUSTRIES, INC.

## (undated) DEVICE — GOWN,SIRUS,POLYRNF,BRTHSLV,XL,30/CS: Brand: MEDLINE

## (undated) DEVICE — BLADE,STAINLESS-STEEL,10,STRL,DISPOSABLE: Brand: MEDLINE

## (undated) DEVICE — PAD PERINL POST FOAM DISPOSABLE FOR AMSCO SURG TBL

## (undated) DEVICE — TOWEL,OR,DSP,ST,BLUE,STD,6/PK,12PK/CS: Brand: MEDLINE

## (undated) DEVICE — TUBING, SUCTION, 9/32" X 10', STRAIGHT: Brand: MEDLINE

## (undated) DEVICE — ELECTRODE PT RET AD L9FT HI MOIST COND ADH HYDRGEL CORDED

## (undated) DEVICE — PAD,ABDOMINAL,5"X9",ST,LF,25/BX: Brand: MEDLINE INDUSTRIES, INC.

## (undated) DEVICE — PACK PROCEDURE SURG GEN CUST

## (undated) DEVICE — BNDG,ELSTC,MATRIX,STRL,6"X5YD,LF,HOOK&LP: Brand: MEDLINE

## (undated) DEVICE — C-ARM: Brand: UNBRANDED

## (undated) DEVICE — GOWN,SIRUS,POLYRNF,BRTHSLV,XLN/XL,20/CS: Brand: MEDLINE

## (undated) DEVICE — BNDG,ELSTC,MATRIX,STRL,4"X5YD,LF,HOOK&LP: Brand: MEDLINE

## (undated) DEVICE — APPLICATOR MEDICATED 26 CC SOLUTION HI LT ORNG CHLORAPREP

## (undated) DEVICE — SOLUTION IRRIG 1000ML STRL H2O USP PLAS POUR BTL